# Patient Record
Sex: FEMALE | Race: WHITE | URBAN - METROPOLITAN AREA
[De-identification: names, ages, dates, MRNs, and addresses within clinical notes are randomized per-mention and may not be internally consistent; named-entity substitution may affect disease eponyms.]

---

## 2017-04-14 LAB
CHOLEST SERPL-MCNC: 195 MG/DL (ref 100–199)
HDLC SERPL-MCNC: 52 MG/DL
LDLC SERPL CALC-MCNC: 117 MG/DL
NONHDLC SERPL-MCNC: 143 MG/DL
PAP-ABSTRACT: NORMAL
TRIGL SERPL-MCNC: 130 MG/DL

## 2019-03-18 LAB
CREAT SERPL-MCNC: 1.01 MG/DL (ref 0.57–1.11)
GFR SERPL CREATININE-BSD FRML MDRD: >60 ML/MIN/1.73M2
GLUCOSE SERPL-MCNC: 89 MG/DL (ref 65–100)
POTASSIUM SERPL-SCNC: 4.3 MMOL/L (ref 3.5–5)

## 2019-06-12 ENCOUNTER — TRANSFERRED RECORDS (OUTPATIENT)
Dept: HEALTH INFORMATION MANAGEMENT | Facility: CLINIC | Age: 29
End: 2019-06-12

## 2019-06-12 LAB — TSH SERPL-ACNC: 0.85 UIU/ML (ref 0.35–4.94)

## 2019-06-24 ENCOUNTER — OFFICE VISIT (OUTPATIENT)
Dept: OBGYN | Facility: CLINIC | Age: 29
End: 2019-06-24
Payer: COMMERCIAL

## 2019-06-24 ENCOUNTER — TELEPHONE (OUTPATIENT)
Dept: OBGYN | Facility: CLINIC | Age: 29
End: 2019-06-24

## 2019-06-24 VITALS
SYSTOLIC BLOOD PRESSURE: 126 MMHG | HEART RATE: 105 BPM | DIASTOLIC BLOOD PRESSURE: 84 MMHG | TEMPERATURE: 98.7 F | WEIGHT: 188.8 LBS

## 2019-06-24 DIAGNOSIS — N97.0 FEMALE INFERTILITY ASSOCIATED WITH ANOVULATION: Primary | ICD-10-CM

## 2019-06-24 LAB — HCG UR QL: NEGATIVE

## 2019-06-24 PROCEDURE — 81025 URINE PREGNANCY TEST: CPT | Performed by: OBSTETRICS & GYNECOLOGY

## 2019-06-24 PROCEDURE — 99204 OFFICE O/P NEW MOD 45 MIN: CPT | Performed by: OBSTETRICS & GYNECOLOGY

## 2019-06-24 RX ORDER — LABETALOL 100 MG/1
50 TABLET, FILM COATED ORAL 2 TIMES DAILY
COMMUNITY
Start: 2019-06-11

## 2019-06-24 RX ORDER — NORTRIPTYLINE HCL 25 MG
25 CAPSULE ORAL DAILY
COMMUNITY
Start: 2019-02-20

## 2019-06-24 RX ORDER — LETROZOLE 2.5 MG/1
7.5 TABLET, FILM COATED ORAL DAILY
Qty: 15 TABLET | Refills: 3 | Status: SHIPPED | OUTPATIENT
Start: 2019-06-24 | End: 2019-06-29

## 2019-06-24 RX ORDER — PRENATAL VIT/IRON FUM/FOLIC AC 27MG-0.8MG
1 TABLET ORAL DAILY
COMMUNITY

## 2019-06-24 NOTE — NURSING NOTE
Initial /84 (BP Location: Right arm, Patient Position: Chair, Cuff Size: Adult Regular)   Pulse 105   Temp 98.7  F (37.1  C) (Tympanic)   Wt 85.6 kg (188 lb 12.8 oz)   LMP 05/24/2019  There is no height or weight on file to calculate BMI. .      Juliann Barger LPN

## 2019-06-24 NOTE — TELEPHONE ENCOUNTER
Please abstract the following data from this visit with this patient into the appropriate field in Epic:    Pap smear done on this date: 4/28/17 (approximately), by this group: [email or web address], results were NEGATIVE FOR INTRAEPITHELIAL LESION OR MALIGNANCY(NIL).

## 2019-06-24 NOTE — PROGRESS NOTES
[facility]  OB/GYN Clinic    CC: Infertility    Subjective:  Jayna Rob is a 28 year old G0 with primary infertility of approximately 2yrs duration.  Had initial evaluation with her PCP, Nelia Mayo at [facility]. Was then referred to [facility] at [facility], saw Adrianne Salas. Had an HSG, basic labwork and did letrazole, 5mg x1 month, then 7.5mg x2 months. Doing timed intercourse at home. Had 1x progesterone level drawn at Day #21, which was 17.5.   Was diagnosed with possible PCOS, although reports regular cycles. When she failed to obtain pregnancy after three months of letrazole ovulation induction, this provider told her they had nothing else to offer her.   She reports profound vaginal dryness on the letrazole.     OB Hx:  G0    Female infertility factors:  Ovulation   Menses: Patient's last menstrual period was 05/24/2019., very regular periods 28-30 days, lasting 3-5 days, menarche age 10, vaginal bleeding reported as normal, denies dysmenorrhea    Ovulation predictor kits: gets +OPKs, on last three months on letrazole, usually gets a + on cycle day #14, didn't do OPKs if she wasn't on letrazole.    +Acne, has 1x chin hair, has treasure trail on belly, no other body hair    Tubal, uterine, cervical factors   No H/o STI     General health   Pertinent PMH: denies    Pertinent medications: see below    Pertinent surgeries: none   Smoking, caffeine, alcohol, drug use: none, patient is a non-smoker    Male infertility factors:   Name, age, birthday: Brody Rob, [birth year]   Semen analysis: done in Oct 2018, normal   No health problems    Infertility treatment to date: see above     History reviewed. No pertinent past medical history.   History reviewed. No pertinent surgical history.   Current Outpatient Medications   Medication     labetalol (NORMODYNE) 100 MG tablet     nortriptyline (PAMELOR) 25 MG capsule     Prenatal Vit-Fe Fumarate-FA (PRENATAL MULTIVITAMIN W/IRON) 27-0.8 MG tablet     No  current facility-administered medications for this visit.      No Known Allergies  History reviewed. No pertinent family history.    Social History     Tobacco Use     Smoking status: Former Smoker     Last attempt to quit: 2015     Years since quittin.4     Smokeless tobacco: Never Used   Substance Use Topics     Alcohol use: Yes     Comment: Occ.     Drug use: Never       ROS: A 10 pt ROS was completed and found to be negative unless mentioned in the HPI.     Objective:   VS: /84 (BP Location: Right arm, Patient Position: Chair, Cuff Size: Adult Regular)   Pulse 105   Temp 98.7  F (37.1  C) (Tympanic)   Wt 85.6 kg (188 lb 12.8 oz)   LMP 2019    Gen: Pleasant, talkative female in no apparent distress   Endocrine: Thyroid without enlargement or nodularity   Dermatology: no acne, hirsutism or facial/back/abdominal hair growth appreciated   Lymph: no appreciable cervical lymphadenopathy  Respiratory: breathing comfortably on room air   Cardiac: warm and well-perfused.   GI: Abd soft and non-tender  MSK: Grossly normal movement of all four extremities  Psych: mood and affect bright     Labs and Imaging:   Normal prolactin, estradiol, FSH, TSH, prolactin.   Negative diabetes and lipid screen.   Normal HSG, semen analysis and pelvic US   Day #21 progesterone with no evidence of ovulation     Assessment/Plan:   My impression is that this is a 28 year old G0 with primary infertility.   Counseled the patient on the basic steps to conception and reproductive anatomy, normal rates of conception during one year (~85%) and suspected pathology for her infertility - anovulation/PCOS given facial acne, progesterone level with anovulation, although has regular cycles even off letrazole. Discussed that she has had 3x cycles of ovulation inducation and failed to obtain pregnancy vs proceeding with more ovulation induction cycles, so she does have the option of proceeding with IVF. However, she only had 1x  progesterone level checked, so uncertain if ovulation was consistently occurring. She knows the progesterone level in Nov was on 7.5mg of letrazole, so wants to proceed with that. Plan for baseline US (discussed risk of stimulating ovarian cysts if present and 10% risk of twinning with ovulation induction). Discussed option of pelvic US to assess for a follicle and B-HCG trigger shot, especially if she was struggling with doing OPKs or timed intercourse at home. Could also do IUI here in the fall or now at a sister clinic. She is comfortable proceeding with letrazole ovulation induction, home OPKs and timed intercourse. Given her profound vaginal dryness with the letrazole, I did recommend a Day #10 pelvic US to assess lining thickness. Could consider clomid if lining is thin, but this is generally worse on clomid.     Missing some lab work for infertility and PCOS, so will complete that with a Day #21 progesterone this month. Repeat every month to assess for ovulation.     S/p normal semen analysis.     Preconception counseling: weight loss     I spent a total of 45 min with the patient, of which >50% was spent in counseling and care coordination.     Will MyChart with results.     Veronica Martinez MD  OB/GYN  6/24/2019

## 2019-06-24 NOTE — PATIENT INSTRUCTIONS
1) Get a pelvic US in the next week. Radiology Phone #: [phone]   2) Start letrazole after this cycle (Day #3-7).  3) Get a repeat pelvic US on Day #10 of your cycle to assess for a dominant follicle and make sure your lining is get thick enough to support a pregnancy.   4) Get blood work drawn on Day #21.   - Dr. Martinez

## 2019-06-25 ENCOUNTER — TELEPHONE (OUTPATIENT)
Dept: OBGYN | Facility: CLINIC | Age: 29
End: 2019-06-25

## 2019-06-25 NOTE — TELEPHONE ENCOUNTER
Return call to patient.  Spoke with patient.  Patient to start Letrozole after next cycle.  Will complete baseline US this week.    Pt in agreement and reports understanding.    Saida Nunez   Ob/Gyn Clinic  RN

## 2019-06-25 NOTE — TELEPHONE ENCOUNTER
Reason for Call:  Other call back    Detailed comments: Pt saw the MD yesterday and was told to get a US done prior to starting some medication, she can not get in for an US until this weekend and she got her period today so she is not sure what to do?  Please call to discuss.    Phone Number Patient can be reached at: Home number on file [phone] (home)    Best Time: ant    Can we leave a detailed message on this number? YES    Call taken on 6/25/2019 at 3:56 PM by Magdalena Campos

## 2019-06-29 ENCOUNTER — HOSPITAL ENCOUNTER (EMERGENCY)
Facility: CLINIC | Age: 29
End: 2019-06-29
Payer: COMMERCIAL

## 2019-06-29 ENCOUNTER — HOSPITAL ENCOUNTER (OUTPATIENT)
Dept: ULTRASOUND IMAGING | Facility: CLINIC | Age: 29
Discharge: HOME | End: 2019-06-29
Attending: OBSTETRICS & GYNECOLOGY | Admitting: OBSTETRICS & GYNECOLOGY
Payer: COMMERCIAL

## 2019-06-29 DIAGNOSIS — N97.0 FEMALE INFERTILITY ASSOCIATED WITH ANOVULATION: ICD-10-CM

## 2019-06-29 PROCEDURE — 76830 TRANSVAGINAL US NON-OB: CPT

## 2019-07-15 ENCOUNTER — TELEPHONE (OUTPATIENT)
Dept: OBGYN | Facility: CLINIC | Age: 29
End: 2019-07-15

## 2019-07-15 NOTE — TELEPHONE ENCOUNTER
Reason for Call:  Other call back    Detailed comments: Pt calling back - states she had a lab ordered by Veronica Martinez MD  For AMH - wondering what this is?  Does it need to be done on a certain cycle day? And if she needs to have it done?  If she can do it when she does her lab appt for other tests on Wednesday.    Phone Number Patient can be reached at: Home number on file [phone] (home)    Best Time: any    Can we leave a detailed message on this number? YES    Call taken on 7/15/2019 at 4:37 PM by Pilar Rao

## 2019-07-16 NOTE — TELEPHONE ENCOUNTER
Call to pt to notify of below.  Unable to reach.  Left message for pt to call back     Saida Nunez   Ob/Gyn Clinic  RN

## 2019-07-16 NOTE — TELEPHONE ENCOUNTER
"AMH = \"anti mullerian hormone\" to assess egg reserve.  We order this in patients that are dealing with infertility to ensure that there are plenty of eggs to work with.      It is not cycle dependent, so it can be drawn anytime.    Lacey Auguste M.D.   "

## 2019-07-16 NOTE — TELEPHONE ENCOUNTER
Pt returns call and was advised of Dr. Auguste's message.  Pt has no further questions at this time.    Osiris Zamarripa  [facility] RN

## 2019-07-17 DIAGNOSIS — N97.0 FEMALE INFERTILITY ASSOCIATED WITH ANOVULATION: ICD-10-CM

## 2019-07-17 LAB — PROGEST SERPL-MCNC: 0.3 NG/ML

## 2019-07-17 PROCEDURE — 84403 ASSAY OF TOTAL TESTOSTERONE: CPT | Performed by: OBSTETRICS & GYNECOLOGY

## 2019-07-17 PROCEDURE — 36415 COLL VENOUS BLD VENIPUNCTURE: CPT | Performed by: OBSTETRICS & GYNECOLOGY

## 2019-07-17 PROCEDURE — 84270 ASSAY OF SEX HORMONE GLOBUL: CPT | Performed by: OBSTETRICS & GYNECOLOGY

## 2019-07-17 PROCEDURE — 99000 SPECIMEN HANDLING OFFICE-LAB: CPT | Performed by: OBSTETRICS & GYNECOLOGY

## 2019-07-17 PROCEDURE — 84143 ASSAY OF 17-HYDROXYPREGNENO: CPT | Mod: 90 | Performed by: OBSTETRICS & GYNECOLOGY

## 2019-07-17 PROCEDURE — 83520 IMMUNOASSAY QUANT NOS NONAB: CPT | Mod: 90 | Performed by: OBSTETRICS & GYNECOLOGY

## 2019-07-17 PROCEDURE — 82627 DEHYDROEPIANDROSTERONE: CPT | Performed by: OBSTETRICS & GYNECOLOGY

## 2019-07-17 PROCEDURE — 84144 ASSAY OF PROGESTERONE: CPT | Performed by: OBSTETRICS & GYNECOLOGY

## 2019-07-18 LAB — DHEA-S SERPL-MCNC: 125 UG/DL (ref 35–430)

## 2019-07-19 LAB
MIS SERPL-MCNC: 17.17 NG/ML (ref 0.4–16.02)
SHBG SERPL-SCNC: 19 NMOL/L (ref 30–135)
TESTOST FREE SERPL-MCNC: 0.83 NG/DL (ref 0.08–0.74)
TESTOST SERPL-MCNC: 35 NG/DL (ref 8–60)

## 2019-07-20 LAB — 17OH-PREG SERPL-MCNC: 368 NG/DL

## 2019-08-07 ENCOUNTER — MYC MEDICAL ADVICE (OUTPATIENT)
Dept: OBGYN | Facility: CLINIC | Age: 29
End: 2019-08-07

## 2019-08-07 DIAGNOSIS — N97.0 FEMALE INFERTILITY ASSOCIATED WITH ANOVULATION: Primary | ICD-10-CM

## 2019-08-07 NOTE — TELEPHONE ENCOUNTER
Please review and advise in Dr. Martinez' absence.  Patient does have a recent AMH on record from 7/17/19.    Thank you.    Saida Nunez   [facility]yn Clinic  RN

## 2019-08-21 ENCOUNTER — TELEPHONE (OUTPATIENT)
Dept: OBGYN | Facility: CLINIC | Age: 29
End: 2019-08-21

## 2019-08-21 NOTE — TELEPHONE ENCOUNTER
Reason for Call:  Disability forms left at OB-GYN Clinic    Detailed comments: Left message for pt to call back - she dropped of a disability form from [employer].  Not sure why she is taking time off from work.  Left message for her to call back to clarify.      Call taken on 8/21/2019 at 8:33 AM by Pilar Rao

## 2019-08-21 NOTE — TELEPHONE ENCOUNTER
Will send to provider pool to inquire if we can fill out FMLA form for pt's request below or does this need to wait until Veronica Martinez MD returns?    Last appt 6-24-19 Veronica Martinez MD for IUI consult    Please advise.    Thanks-    -Pliar Rao  [facility]

## 2019-08-21 NOTE — TELEPHONE ENCOUNTER
Yahir, MD Maira Mcgill Pamela F   Caller: Unspecified (Today,  8:32 AM)             Notify pt that we can fill out FMLA papers for intermittant absences due to fertility treatment   Jodi Sinclair MD   [facility]        Form started and given to Dr. Sinclair to complete.    -Pilar Rao  [facility]

## 2019-08-21 NOTE — TELEPHONE ENCOUNTER
Pt called back stating she qualifies for FMLA for when she goes to do treatment at a specialist in [place] and currently while doing infertility treatment.   She is taking only taking intermittent leave.     Beatrice Alicia CSS

## 2019-08-22 NOTE — TELEPHONE ENCOUNTER
Paper work rec'd on 8-15-19 was filled out and mailed as requested.  Sent to be scanned.  Copy put up front.    -Pilar PENALOZA Maira  [facility] [place]

## 2019-09-07 DIAGNOSIS — N97.0 FEMALE INFERTILITY ASSOCIATED WITH ANOVULATION: ICD-10-CM

## 2019-09-07 LAB
ESTRADIOL SERPL-MCNC: 18 PG/ML
FSH SERPL-ACNC: 8.6 IU/L
LH SERPL-ACNC: 7.3 IU/L

## 2019-09-07 PROCEDURE — 83520 IMMUNOASSAY QUANT NOS NONAB: CPT | Mod: 90 | Performed by: OBSTETRICS & GYNECOLOGY

## 2019-09-07 PROCEDURE — 83002 ASSAY OF GONADOTROPIN (LH): CPT | Performed by: OBSTETRICS & GYNECOLOGY

## 2019-09-07 PROCEDURE — 36415 COLL VENOUS BLD VENIPUNCTURE: CPT | Performed by: OBSTETRICS & GYNECOLOGY

## 2019-09-07 PROCEDURE — 99000 SPECIMEN HANDLING OFFICE-LAB: CPT | Performed by: OBSTETRICS & GYNECOLOGY

## 2019-09-07 PROCEDURE — 83001 ASSAY OF GONADOTROPIN (FSH): CPT | Performed by: OBSTETRICS & GYNECOLOGY

## 2019-09-07 PROCEDURE — 82670 ASSAY OF TOTAL ESTRADIOL: CPT | Performed by: OBSTETRICS & GYNECOLOGY

## 2019-09-11 LAB — MIS SERPL-MCNC: 11.07 NG/ML (ref 0.4–16.02)

## 2020-03-02 ENCOUNTER — HEALTH MAINTENANCE LETTER (OUTPATIENT)
Age: 30
End: 2020-03-02

## 2020-12-20 ENCOUNTER — HEALTH MAINTENANCE LETTER (OUTPATIENT)
Age: 30
End: 2020-12-20

## 2021-04-24 ENCOUNTER — HEALTH MAINTENANCE LETTER (OUTPATIENT)
Age: 31
End: 2021-04-24

## 2021-06-30 DIAGNOSIS — Z31.41 FERTILITY TESTING: Primary | ICD-10-CM

## 2021-06-30 LAB
ABO + RH BLD: NORMAL
ABO + RH BLD: NORMAL
ALBUMIN SERPL-MCNC: 4.2 G/DL (ref 3.4–5)
ALP SERPL-CCNC: 36 U/L (ref 40–150)
ALT SERPL W P-5'-P-CCNC: 20 U/L (ref 0–50)
ANION GAP SERPL CALCULATED.3IONS-SCNC: 6 MMOL/L (ref 3–14)
AST SERPL W P-5'-P-CCNC: 17 U/L (ref 0–45)
B-HCG SERPL-ACNC: <1 IU/L (ref 0–5)
BILIRUB SERPL-MCNC: 0.6 MG/DL (ref 0.2–1.3)
BLD GP AB SCN SERPL QL: NORMAL
BLOOD BANK CMNT PATIENT-IMP: NORMAL
BLOOD BANK CMNT PATIENT-IMP: NORMAL
BUN SERPL-MCNC: 11 MG/DL (ref 7–30)
CALCIUM SERPL-MCNC: 9.1 MG/DL (ref 8.5–10.1)
CHLORIDE SERPL-SCNC: 107 MMOL/L (ref 94–109)
CO2 SERPL-SCNC: 27 MMOL/L (ref 20–32)
CREAT SERPL-MCNC: 0.86 MG/DL (ref 0.52–1.04)
ERYTHROCYTE [DISTWIDTH] IN BLOOD BY AUTOMATED COUNT: 13.1 % (ref 10–15)
ESTRADIOL SERPL-MCNC: 182 PG/ML
FSH SERPL-ACNC: 14.5 IU/L
GFR SERPL CREATININE-BSD FRML MDRD: >90 ML/MIN/{1.73_M2}
GLUCOSE SERPL-MCNC: 82 MG/DL (ref 70–99)
HBA1C MFR BLD: 4.7 % (ref 0–5.6)
HCT VFR BLD AUTO: 39.3 % (ref 35–47)
HGB BLD-MCNC: 13.8 G/DL (ref 11.7–15.7)
LH SERPL-ACNC: 56.5 IU/L
MCH RBC QN AUTO: 30.2 PG (ref 26.5–33)
MCHC RBC AUTO-ENTMCNC: 35.1 G/DL (ref 31.5–36.5)
MCV RBC AUTO: 86 FL (ref 78–100)
PLATELET # BLD AUTO: 268 10E9/L (ref 150–450)
POTASSIUM SERPL-SCNC: 3.8 MMOL/L (ref 3.4–5.3)
PROGEST SERPL-MCNC: 0.8 NG/ML
PROLACTIN SERPL-MCNC: 26 UG/L (ref 3–27)
PROT SERPL-MCNC: 7.1 G/DL (ref 6.8–8.8)
RBC # BLD AUTO: 4.57 10E12/L (ref 3.8–5.2)
SODIUM SERPL-SCNC: 140 MMOL/L (ref 133–144)
SPECIMEN EXP DATE BLD: NORMAL
TSH SERPL DL<=0.005 MIU/L-ACNC: 1.6 MU/L (ref 0.4–4)
WBC # BLD AUTO: 7.2 10E9/L (ref 4–11)

## 2021-06-30 PROCEDURE — 84144 ASSAY OF PROGESTERONE: CPT | Performed by: OBSTETRICS & GYNECOLOGY

## 2021-06-30 PROCEDURE — 86787 VARICELLA-ZOSTER ANTIBODY: CPT | Performed by: OBSTETRICS & GYNECOLOGY

## 2021-06-30 PROCEDURE — 84443 ASSAY THYROID STIM HORMONE: CPT | Performed by: OBSTETRICS & GYNECOLOGY

## 2021-06-30 PROCEDURE — 36415 COLL VENOUS BLD VENIPUNCTURE: CPT | Performed by: OBSTETRICS & GYNECOLOGY

## 2021-06-30 PROCEDURE — 99000 SPECIMEN HANDLING OFFICE-LAB: CPT | Performed by: OBSTETRICS & GYNECOLOGY

## 2021-06-30 PROCEDURE — 83001 ASSAY OF GONADOTROPIN (FSH): CPT | Performed by: OBSTETRICS & GYNECOLOGY

## 2021-06-30 PROCEDURE — 87340 HEPATITIS B SURFACE AG IA: CPT | Performed by: OBSTETRICS & GYNECOLOGY

## 2021-06-30 PROCEDURE — 82306 VITAMIN D 25 HYDROXY: CPT | Performed by: OBSTETRICS & GYNECOLOGY

## 2021-06-30 PROCEDURE — 83036 HEMOGLOBIN GLYCOSYLATED A1C: CPT | Performed by: OBSTETRICS & GYNECOLOGY

## 2021-06-30 PROCEDURE — 84702 CHORIONIC GONADOTROPIN TEST: CPT | Performed by: OBSTETRICS & GYNECOLOGY

## 2021-06-30 PROCEDURE — 86900 BLOOD TYPING SEROLOGIC ABO: CPT | Performed by: OBSTETRICS & GYNECOLOGY

## 2021-06-30 PROCEDURE — 84270 ASSAY OF SEX HORMONE GLOBUL: CPT | Performed by: OBSTETRICS & GYNECOLOGY

## 2021-06-30 PROCEDURE — 83002 ASSAY OF GONADOTROPIN (LH): CPT | Performed by: OBSTETRICS & GYNECOLOGY

## 2021-06-30 PROCEDURE — 86780 TREPONEMA PALLIDUM: CPT | Mod: 90 | Performed by: OBSTETRICS & GYNECOLOGY

## 2021-06-30 PROCEDURE — 85027 COMPLETE CBC AUTOMATED: CPT | Performed by: OBSTETRICS & GYNECOLOGY

## 2021-06-30 PROCEDURE — 84146 ASSAY OF PROLACTIN: CPT | Performed by: OBSTETRICS & GYNECOLOGY

## 2021-06-30 PROCEDURE — 86803 HEPATITIS C AB TEST: CPT | Performed by: OBSTETRICS & GYNECOLOGY

## 2021-06-30 PROCEDURE — 83520 IMMUNOASSAY QUANT NOS NONAB: CPT | Mod: 90 | Performed by: OBSTETRICS & GYNECOLOGY

## 2021-06-30 PROCEDURE — 80053 COMPREHEN METABOLIC PANEL: CPT | Performed by: OBSTETRICS & GYNECOLOGY

## 2021-06-30 PROCEDURE — 82670 ASSAY OF TOTAL ESTRADIOL: CPT | Performed by: OBSTETRICS & GYNECOLOGY

## 2021-06-30 PROCEDURE — 86850 RBC ANTIBODY SCREEN: CPT | Performed by: OBSTETRICS & GYNECOLOGY

## 2021-06-30 PROCEDURE — 84403 ASSAY OF TOTAL TESTOSTERONE: CPT | Mod: 90 | Performed by: OBSTETRICS & GYNECOLOGY

## 2021-06-30 PROCEDURE — 86762 RUBELLA ANTIBODY: CPT | Performed by: OBSTETRICS & GYNECOLOGY

## 2021-06-30 PROCEDURE — 87389 HIV-1 AG W/HIV-1&-2 AB AG IA: CPT | Performed by: OBSTETRICS & GYNECOLOGY

## 2021-06-30 PROCEDURE — 86901 BLOOD TYPING SEROLOGIC RH(D): CPT | Performed by: OBSTETRICS & GYNECOLOGY

## 2021-07-01 LAB
DEPRECATED CALCIDIOL+CALCIFEROL SERPL-MC: 63 UG/L (ref 20–75)
HBV SURFACE AG SERPL QL IA: NONREACTIVE
HCV AB SERPL QL IA: NONREACTIVE
HIV 1+2 AB+HIV1 P24 AG SERPL QL IA: NONREACTIVE
RUBV IGG SERPL IA-ACNC: 15 IU/ML
T PALLIDUM AB SER QL: NONREACTIVE
VZV IGG SER QL IA: 1.8 AI (ref 0–0.8)

## 2021-07-02 LAB
SHBG SERPL-SCNC: 38 NMOL/L (ref 30–135)
TESTOST FREE SERPL-MCNC: 0.42 NG/DL (ref 0.08–0.74)
TESTOST SERPL-MCNC: 26 NG/DL (ref 8–60)

## 2021-07-03 LAB — MIS SERPL-MCNC: 16.16 NG/ML (ref 0.18–11.71)

## 2021-07-15 ENCOUNTER — MEDICAL CORRESPONDENCE (OUTPATIENT)
Dept: HEALTH INFORMATION MANAGEMENT | Facility: CLINIC | Age: 31
End: 2021-07-15

## 2021-07-21 ENCOUNTER — LAB (OUTPATIENT)
Dept: LAB | Facility: CLINIC | Age: 31
End: 2021-07-21
Payer: COMMERCIAL

## 2021-07-21 DIAGNOSIS — E28.9 OVARIAN DYSFUNCTION: Primary | ICD-10-CM

## 2021-07-21 LAB
ESTRADIOL SERPL-MCNC: 355 PG/ML
LH SERPL-ACNC: 1.3 IU/L
PROGEST SERPL-MCNC: <0.2 NG/ML

## 2021-07-21 PROCEDURE — 84144 ASSAY OF PROGESTERONE: CPT

## 2021-07-21 PROCEDURE — 36415 COLL VENOUS BLD VENIPUNCTURE: CPT

## 2021-07-21 PROCEDURE — 82670 ASSAY OF TOTAL ESTRADIOL: CPT

## 2021-07-21 PROCEDURE — 83002 ASSAY OF GONADOTROPIN (LH): CPT

## 2021-10-03 ENCOUNTER — HEALTH MAINTENANCE LETTER (OUTPATIENT)
Age: 31
End: 2021-10-03

## 2021-10-26 ENCOUNTER — LAB (OUTPATIENT)
Dept: LAB | Facility: CLINIC | Age: 31
End: 2021-10-26
Payer: COMMERCIAL

## 2021-10-26 DIAGNOSIS — Z31.83 ENCOUNTER FOR ASSISTED REPRODUCTIVE FERTILITY PROCEDURE CYCLE: Primary | ICD-10-CM

## 2021-10-26 LAB
B-HCG SERPL-ACNC: <1 IU/L (ref 0–5)
TSH SERPL DL<=0.005 MIU/L-ACNC: 2.16 MU/L (ref 0.4–4)

## 2021-10-26 PROCEDURE — 84443 ASSAY THYROID STIM HORMONE: CPT

## 2021-10-26 PROCEDURE — 82670 ASSAY OF TOTAL ESTRADIOL: CPT

## 2021-10-26 PROCEDURE — 84702 CHORIONIC GONADOTROPIN TEST: CPT

## 2021-10-26 PROCEDURE — 36415 COLL VENOUS BLD VENIPUNCTURE: CPT

## 2021-10-26 PROCEDURE — 83002 ASSAY OF GONADOTROPIN (LH): CPT

## 2021-10-26 PROCEDURE — 83001 ASSAY OF GONADOTROPIN (FSH): CPT

## 2021-10-26 PROCEDURE — 84144 ASSAY OF PROGESTERONE: CPT

## 2021-10-27 LAB
ESTRADIOL SERPL-MCNC: 34 PG/ML
FSH SERPL-ACNC: 8.7 IU/L
LH SERPL-ACNC: 4.6 IU/L
PROGEST SERPL-MCNC: 0.2 NG/ML

## 2022-05-15 ENCOUNTER — HEALTH MAINTENANCE LETTER (OUTPATIENT)
Age: 32
End: 2022-05-15

## 2022-09-10 ENCOUNTER — HEALTH MAINTENANCE LETTER (OUTPATIENT)
Age: 32
End: 2022-09-10

## 2023-06-03 ENCOUNTER — HEALTH MAINTENANCE LETTER (OUTPATIENT)
Age: 33
End: 2023-06-03

## 2024-07-07 ENCOUNTER — HEALTH MAINTENANCE LETTER (OUTPATIENT)
Age: 34
End: 2024-07-07